# Patient Record
Sex: FEMALE | Race: BLACK OR AFRICAN AMERICAN | NOT HISPANIC OR LATINO
[De-identification: names, ages, dates, MRNs, and addresses within clinical notes are randomized per-mention and may not be internally consistent; named-entity substitution may affect disease eponyms.]

---

## 2018-11-23 ENCOUNTER — APPOINTMENT (OUTPATIENT)
Dept: ANTEPARTUM | Facility: CLINIC | Age: 23
End: 2018-11-23

## 2018-11-23 ENCOUNTER — OUTPATIENT (OUTPATIENT)
Dept: OUTPATIENT SERVICES | Facility: HOSPITAL | Age: 23
LOS: 1 days | Discharge: HOME | End: 2018-11-23

## 2019-01-18 ENCOUNTER — TRANSCRIPTION ENCOUNTER (OUTPATIENT)
Age: 24
End: 2019-01-18

## 2019-01-18 ENCOUNTER — APPOINTMENT (OUTPATIENT)
Dept: ANTEPARTUM | Facility: CLINIC | Age: 24
End: 2019-01-18

## 2019-01-18 ENCOUNTER — OUTPATIENT (OUTPATIENT)
Dept: OUTPATIENT SERVICES | Facility: HOSPITAL | Age: 24
LOS: 1 days | Discharge: HOME | End: 2019-01-18

## 2019-06-18 ENCOUNTER — INPATIENT (INPATIENT)
Facility: HOSPITAL | Age: 24
LOS: 2 days | Discharge: HOME | End: 2019-06-21
Attending: OBSTETRICS & GYNECOLOGY | Admitting: OBSTETRICS & GYNECOLOGY
Payer: COMMERCIAL

## 2019-06-18 VITALS — TEMPERATURE: 99 F

## 2019-06-18 DIAGNOSIS — Z98.890 OTHER SPECIFIED POSTPROCEDURAL STATES: Chronic | ICD-10-CM

## 2019-06-18 LAB
APPEARANCE UR: ABNORMAL
BACTERIA # UR AUTO: ABNORMAL /HPF
BASOPHILS # BLD AUTO: 0.02 K/UL — SIGNIFICANT CHANGE UP (ref 0–0.2)
BASOPHILS NFR BLD AUTO: 0.3 % — SIGNIFICANT CHANGE UP (ref 0–1)
BILIRUB UR-MCNC: NEGATIVE — SIGNIFICANT CHANGE UP
COLOR SPEC: YELLOW — SIGNIFICANT CHANGE UP
DIFF PNL FLD: NEGATIVE — SIGNIFICANT CHANGE UP
EOSINOPHIL # BLD AUTO: 0.03 K/UL — SIGNIFICANT CHANGE UP (ref 0–0.7)
EOSINOPHIL NFR BLD AUTO: 0.4 % — SIGNIFICANT CHANGE UP (ref 0–8)
EPI CELLS # UR: ABNORMAL /HPF
GLUCOSE UR QL: NEGATIVE MG/DL — SIGNIFICANT CHANGE UP
HCT VFR BLD CALC: 34.9 % — LOW (ref 37–47)
HGB BLD-MCNC: 11.3 G/DL — LOW (ref 12–16)
IMM GRANULOCYTES NFR BLD AUTO: 0.6 % — HIGH (ref 0.1–0.3)
KETONES UR-MCNC: NEGATIVE — SIGNIFICANT CHANGE UP
LEUKOCYTE ESTERASE UR-ACNC: ABNORMAL
LYMPHOCYTES # BLD AUTO: 1.47 K/UL — SIGNIFICANT CHANGE UP (ref 1.2–3.4)
LYMPHOCYTES # BLD AUTO: 20.7 % — SIGNIFICANT CHANGE UP (ref 20.5–51.1)
MCHC RBC-ENTMCNC: 27.5 PG — SIGNIFICANT CHANGE UP (ref 27–31)
MCHC RBC-ENTMCNC: 32.4 G/DL — SIGNIFICANT CHANGE UP (ref 32–37)
MCV RBC AUTO: 84.9 FL — SIGNIFICANT CHANGE UP (ref 81–99)
MONOCYTES # BLD AUTO: 0.82 K/UL — HIGH (ref 0.1–0.6)
MONOCYTES NFR BLD AUTO: 11.6 % — HIGH (ref 1.7–9.3)
NEUTROPHILS # BLD AUTO: 4.71 K/UL — SIGNIFICANT CHANGE UP (ref 1.4–6.5)
NEUTROPHILS NFR BLD AUTO: 66.4 % — SIGNIFICANT CHANGE UP (ref 42.2–75.2)
NITRITE UR-MCNC: NEGATIVE — SIGNIFICANT CHANGE UP
NRBC # BLD: 0 /100 WBCS — SIGNIFICANT CHANGE UP (ref 0–0)
PH UR: 6.5 — SIGNIFICANT CHANGE UP (ref 5–8)
PLATELET # BLD AUTO: 120 K/UL — LOW (ref 130–400)
PRENATAL SYPHILIS TEST: SIGNIFICANT CHANGE UP
PROT UR-MCNC: ABNORMAL MG/DL
RBC # BLD: 4.11 M/UL — LOW (ref 4.2–5.4)
RBC # FLD: 14.1 % — SIGNIFICANT CHANGE UP (ref 11.5–14.5)
SP GR SPEC: 1.02 — SIGNIFICANT CHANGE UP (ref 1.01–1.03)
UROBILINOGEN FLD QL: 1 MG/DL (ref 0.2–0.2)
WBC # BLD: 7.09 K/UL — SIGNIFICANT CHANGE UP (ref 4.8–10.8)
WBC # FLD AUTO: 7.09 K/UL — SIGNIFICANT CHANGE UP (ref 4.8–10.8)
WBC UR QL: ABNORMAL /HPF

## 2019-06-18 RX ORDER — DINOPROSTONE 10 MG/241MG
10 INSERT VAGINAL ONCE
Refills: 0 | Status: COMPLETED | OUTPATIENT
Start: 2019-06-18 | End: 2019-06-18

## 2019-06-18 RX ORDER — OXYTOCIN 10 UNIT/ML
41.67 VIAL (ML) INJECTION
Qty: 20 | Refills: 0 | Status: DISCONTINUED | OUTPATIENT
Start: 2019-06-18 | End: 2019-06-21

## 2019-06-18 RX ORDER — SODIUM CHLORIDE 9 MG/ML
1000 INJECTION, SOLUTION INTRAVENOUS
Refills: 0 | Status: DISCONTINUED | OUTPATIENT
Start: 2019-06-18 | End: 2019-06-19

## 2019-06-18 RX ADMIN — DINOPROSTONE 10 MILLIGRAM(S): 10 INSERT VAGINAL at 21:40

## 2019-06-18 NOTE — OB PROVIDER H&P - NSHPLABSRESULTS_GEN_ALL_CORE
Sonograms     Sonograms  12w- dated c/w size, CL 37mm, ovaries normal, N 1mm  20w- breech, 3vc, ant placenta, MVP 48mm, CL 40mm, anatomy wnl

## 2019-06-18 NOTE — OB PROVIDER H&P - HISTORY OF PRESENT ILLNESS
23yo  at 41w4d, dated by LMP c/w first trimester sonogram, presents to labor and delivery for induction of labor for post EDC. Patient denies contractions, leakage of fluids, vaginal bleeding. Good fetal movement. Denies any complications in this pregnancy. GBS negative.

## 2019-06-18 NOTE — OB PROVIDER H&P - NSHPPHYSICALEXAM_GEN_ALL_CORE
Physical exam:    Vital Signs Last 24 Hrs  T(F): 98.6 (18 Jun 2019 20:50), Max: 98.6 (18 Jun 2019 20:40)  HR: 98 (18 Jun 2019 20:50) (98 - 98)  BP: 120/74 (18 Jun 2019 20:50) (120/74 - 120/74)  RR: 20 (18 Jun 2019 20:50) (20 - 20)  Udip trace protein  Gen: NAD  Abdomen: Soft, nontender, gravid. EFW 3600g  VE:  EFM: 125/mod/+accels  toco: occasional contractions Physical exam:    Vital Signs Last 24 Hrs  T(F): 98.6 (18 Jun 2019 20:50), Max: 98.6 (18 Jun 2019 20:40)  HR: 98 (18 Jun 2019 20:50) (98 - 98)  BP: 120/74 (18 Jun 2019 20:50) (120/74 - 120/74)  RR: 20 (18 Jun 2019 20:50) (20 - 20)  Udip trace protein  Gen: NAD  Abdomen: Soft, nontender, gravid. EFW 3600g  VE:  EFM: 125/mod/+accels  toco: occasional contractions  Sono: vertex Physical exam:  Vital Signs Last 24 Hrs  T(F): 98.6 (18 Jun 2019 20:50), Max: 98.6 (18 Jun 2019 20:40)  HR: 98 (18 Jun 2019 20:50) (98 - 98)  BP: 120/74 (18 Jun 2019 20:50) (120/74 - 120/74)  RR: 20 (18 Jun 2019 20:50) (20 - 20)  Udip trace protein  Gen: NAD  Abdomen: Soft, nontender, gravid. EFW 3600g  VE: cl/l/p vertex by sono, intact  EFM: 125/mod/+accels  toco: occasional contractions  Sono: vertex

## 2019-06-18 NOTE — OB PROVIDER H&P - ASSESSMENT
25yo  at 41w4d, GBS negative, measles immune, for IOL for postdates    -Admit to labor and delivery  -IV hydration and clear liquid diet  -Cervidil for induction  -Cont efm and toco  -Pain management PRN      Dr. Colon and Dr. Petit aware

## 2019-06-19 LAB
AMPHET UR-MCNC: NEGATIVE — SIGNIFICANT CHANGE UP
BARBITURATES UR SCN-MCNC: NEGATIVE — SIGNIFICANT CHANGE UP
BENZODIAZ UR-MCNC: NEGATIVE — SIGNIFICANT CHANGE UP
BLD GP AB SCN SERPL QL: SIGNIFICANT CHANGE UP
BUPRENORPHINE SCREEN, URINE RESULT: NEGATIVE — SIGNIFICANT CHANGE UP
COCAINE METAB.OTHER UR-MCNC: NEGATIVE — SIGNIFICANT CHANGE UP
L&D DRUG SCREEN, URINE: SIGNIFICANT CHANGE UP
METHADONE UR-MCNC: NEGATIVE — SIGNIFICANT CHANGE UP
OPIATES UR-MCNC: NEGATIVE — SIGNIFICANT CHANGE UP
OXYCODONE UR-MCNC: NEGATIVE — SIGNIFICANT CHANGE UP
PCP UR-MCNC: NEGATIVE — SIGNIFICANT CHANGE UP
PROPOXYPHENE QUALITATIVE URINE RESULT: NEGATIVE — SIGNIFICANT CHANGE UP

## 2019-06-19 PROCEDURE — 59400 OBSTETRICAL CARE: CPT

## 2019-06-19 RX ORDER — KETOROLAC TROMETHAMINE 30 MG/ML
30 SYRINGE (ML) INJECTION ONCE
Refills: 0 | Status: DISCONTINUED | OUTPATIENT
Start: 2019-06-19 | End: 2019-06-19

## 2019-06-19 RX ORDER — IBUPROFEN 200 MG
600 TABLET ORAL EVERY 6 HOURS
Refills: 0 | Status: COMPLETED | OUTPATIENT
Start: 2019-06-19 | End: 2020-05-17

## 2019-06-19 RX ORDER — DIPHENHYDRAMINE HCL 50 MG
25 CAPSULE ORAL EVERY 6 HOURS
Refills: 0 | Status: DISCONTINUED | OUTPATIENT
Start: 2019-06-19 | End: 2019-06-21

## 2019-06-19 RX ORDER — AER TRAVELER 0.5 G/1
1 SOLUTION RECTAL; TOPICAL EVERY 4 HOURS
Refills: 0 | Status: DISCONTINUED | OUTPATIENT
Start: 2019-06-19 | End: 2019-06-21

## 2019-06-19 RX ORDER — HYDROCORTISONE 1 %
1 OINTMENT (GRAM) TOPICAL EVERY 6 HOURS
Refills: 0 | Status: DISCONTINUED | OUTPATIENT
Start: 2019-06-19 | End: 2019-06-21

## 2019-06-19 RX ORDER — FENTANYL/BUPIVACAINE/NS/PF 2MCG/ML-.1
250 PLASTIC BAG, INJECTION (ML) INJECTION
Refills: 0 | Status: DISCONTINUED | OUTPATIENT
Start: 2019-06-19 | End: 2019-06-21

## 2019-06-19 RX ORDER — OXYCODONE HYDROCHLORIDE 5 MG/1
5 TABLET ORAL
Refills: 0 | Status: DISCONTINUED | OUTPATIENT
Start: 2019-06-19 | End: 2019-06-21

## 2019-06-19 RX ORDER — OXYTOCIN 10 UNIT/ML
333.33 VIAL (ML) INJECTION
Qty: 20 | Refills: 0 | Status: DISCONTINUED | OUTPATIENT
Start: 2019-06-19 | End: 2019-06-21

## 2019-06-19 RX ORDER — GLYCERIN ADULT
1 SUPPOSITORY, RECTAL RECTAL AT BEDTIME
Refills: 0 | Status: DISCONTINUED | OUTPATIENT
Start: 2019-06-19 | End: 2019-06-21

## 2019-06-19 RX ORDER — DOCUSATE SODIUM 100 MG
100 CAPSULE ORAL
Refills: 0 | Status: DISCONTINUED | OUTPATIENT
Start: 2019-06-19 | End: 2019-06-21

## 2019-06-19 RX ORDER — DIBUCAINE 1 %
1 OINTMENT (GRAM) RECTAL EVERY 6 HOURS
Refills: 0 | Status: DISCONTINUED | OUTPATIENT
Start: 2019-06-19 | End: 2019-06-21

## 2019-06-19 RX ORDER — LANOLIN
1 OINTMENT (GRAM) TOPICAL EVERY 6 HOURS
Refills: 0 | Status: DISCONTINUED | OUTPATIENT
Start: 2019-06-19 | End: 2019-06-21

## 2019-06-19 RX ORDER — PRAMOXINE HYDROCHLORIDE 150 MG/15G
1 AEROSOL, FOAM RECTAL EVERY 4 HOURS
Refills: 0 | Status: DISCONTINUED | OUTPATIENT
Start: 2019-06-19 | End: 2019-06-21

## 2019-06-19 RX ORDER — DEXAMETHASONE 0.5 MG/5ML
4 ELIXIR ORAL EVERY 6 HOURS
Refills: 0 | Status: DISCONTINUED | OUTPATIENT
Start: 2019-06-19 | End: 2019-06-21

## 2019-06-19 RX ORDER — SIMETHICONE 80 MG/1
80 TABLET, CHEWABLE ORAL EVERY 4 HOURS
Refills: 0 | Status: DISCONTINUED | OUTPATIENT
Start: 2019-06-19 | End: 2019-06-21

## 2019-06-19 RX ORDER — ACETAMINOPHEN 500 MG
975 TABLET ORAL
Refills: 0 | Status: DISCONTINUED | OUTPATIENT
Start: 2019-06-19 | End: 2019-06-21

## 2019-06-19 RX ORDER — NALOXONE HYDROCHLORIDE 4 MG/.1ML
0.1 SPRAY NASAL
Refills: 0 | Status: DISCONTINUED | OUTPATIENT
Start: 2019-06-19 | End: 2019-06-21

## 2019-06-19 RX ORDER — OXYCODONE HYDROCHLORIDE 5 MG/1
5 TABLET ORAL ONCE
Refills: 0 | Status: DISCONTINUED | OUTPATIENT
Start: 2019-06-19 | End: 2019-06-21

## 2019-06-19 RX ORDER — MAGNESIUM HYDROXIDE 400 MG/1
30 TABLET, CHEWABLE ORAL
Refills: 0 | Status: DISCONTINUED | OUTPATIENT
Start: 2019-06-19 | End: 2019-06-21

## 2019-06-19 RX ORDER — OXYTOCIN 10 UNIT/ML
2 VIAL (ML) INJECTION
Qty: 30 | Refills: 0 | Status: DISCONTINUED | OUTPATIENT
Start: 2019-06-19 | End: 2019-06-19

## 2019-06-19 RX ORDER — ONDANSETRON 8 MG/1
4 TABLET, FILM COATED ORAL EVERY 6 HOURS
Refills: 0 | Status: DISCONTINUED | OUTPATIENT
Start: 2019-06-19 | End: 2019-06-21

## 2019-06-19 RX ORDER — BENZOCAINE 10 %
1 GEL (GRAM) MUCOUS MEMBRANE EVERY 6 HOURS
Refills: 0 | Status: DISCONTINUED | OUTPATIENT
Start: 2019-06-19 | End: 2019-06-21

## 2019-06-19 RX ADMIN — SODIUM CHLORIDE 125 MILLILITER(S): 9 INJECTION, SOLUTION INTRAVENOUS at 09:46

## 2019-06-19 RX ADMIN — Medication 2 MILLIUNIT(S)/MIN: at 09:46

## 2019-06-19 RX ADMIN — Medication 30 MILLIGRAM(S): at 20:59

## 2019-06-19 NOTE — PROGRESS NOTE ADULT - SUBJECTIVE AND OBJECTIVE BOX
PGY2 Note:    Pt evaluated at bedside.    Vital Signs Last 24 Hrs  T(F): 98.24 (2019 16:54), Max: 98.6 (2019 20:40)  HR: 95 (2019 19:04) (57 - 98)  BP: 119/69 (2019 19:04) (97/56 - 132/77)  RR: 21 (2019 16:54) (19 - 21)    EFM: 125/mod/accels, occasional variable decelerations  [place]: q2-3min  SVE: 9.5/100/+1    Labs:                          11.3   7.09  )-----------( 120      ( 2019 21:00 )             34.9     Urinalysis Basic - ( 2019 21:00 )    Color: Yellow / Appearance: Cloudy / S.020 / pH: x  Gluc: x / Ketone: Negative  / Bili: Negative / Urobili: 1.0 mg/dL   Blood: x / Protein: Trace mg/dL / Nitrite: Negative   Leuk Esterase: Small / RBC: x / WBC 6-10 /HPF   Sq Epi: x / Non Sq Epi: Few /HPF / Bacteria: Few /HPF    Type + Screen (19 @ 04:20)    ABO RH Interpretation: B POS: 2019 5:38  SBELLAMY  No historical record of blood group and Rh, requires a second sample for  retype prior to the release of any blood products.  For prenatal, a  second sample on next visit.    Prenatal Syphilis Test (19 @ 21:00)    Prenatal Syphilis Test: Nonreact: Test Performed at:  [address]  [address]    UDS negative    Meds:  Pitocin  Epidural in place

## 2019-06-19 NOTE — PROGRESS NOTE ADULT - SUBJECTIVE AND OBJECTIVE BOX
Patient seen at bedside, resting comfortably. No complaints at this time.    T(C): 36.8 (06-19-19 @ 07:07), Max: 37 (06-18-19 @ 20:40)  HR: 91 (06-19-19 @ 09:09) (57 - 98)  BP: 117/64 (06-19-19 @ 09:09) (97/56 - 132/77)  RR: 19 (06-19-19 @ 07:07) (19 - 20)    EFM: 110/mod/+accels  TOCO: none  SVE: 3/80/-3, cervidil removed @ 0830    Meds:  lactated ringers. 1000 milliLiter(s) IV Continuous <Continuous>  oxytocin Infusion 41.667 milliUNIT(s)/Min IV Continuous <Continuous>  oxytocin Infusion 2 milliUNIT(s)/Min IV Continuous <Continuous>    Labs:    A/P: +  24y GP @ weeks, GBS ,    Plan:  Continue EFM/TOCO  Continue IV hydration  Continue Pitocin  Epidural in Place  Pain management PRN    Dr. eldridge Patient seen at bedside, resting comfortably. No complaints at this time.    T(C): 36.8 (19 @ 07:07), Max: 37 (19 @ 20:40)  HR: 91 (19 @ 09:09) (57 - 98)  BP: 117/64 (19 @ 09:09) (97/56 - 132/77)  RR: 19 (19 @ 07:07) (19 - 20)    EFM: 110/mod/+accels  TOCO: none  SVE: 380/-3, cervidil removed @ 0830    Meds:  lactated ringers. 1000 milliLiter(s) IV Continuous <Continuous>  oxytocin Infusion 41.667 milliUNIT(s)/Min IV Continuous <Continuous>  oxytocin Infusion 2 milliUNIT(s)/Min IV Continuous <Continuous>    Labs:    A/P: +  23 yo  @41w5d, GBS neg, measles immune, IOL for post dates, s/p cervidil, doing well.    Plan:  Continue EFM/TOCO  Continue IV hydration  Pitocin to be started after eating  Pain management PRN    Dr. Petit to be made aware

## 2019-06-19 NOTE — OB PROVIDER DELIVERY SUMMARY - NSPROVIDERDELIVERYNOTE_OBGYN_ALL_OB_FT
pt delivered a viable female infant over intact perenium  placenta delivered intact and spont.   pt stable mild lochia   uterus firm   no laceration   baby to reg nursery  pt stable.

## 2019-06-19 NOTE — PROGRESS NOTE ADULT - ASSESSMENT
23 yo  @41w5d, GBS neg, measles immune, IOL for post dates, cervidil in place, doing well.    - cont EFM and toco  - pain management PRN  - clear liquid diet, IV hydration  - monitor vitals  - c/w cervidil  - f/up UDS, t&s    Dr. Colon and Dr. Petit aware.

## 2019-06-19 NOTE — PROGRESS NOTE ADULT - SUBJECTIVE AND OBJECTIVE BOX
PGY I Note    S: Pt seen at bedside for labor check. Doing well, pain controlled.     Vital Signs Last 24 Hrs  T(F): 98.6 (2019 20:50), Max: 98.6 (2019 20:40)  HR: 73 (2019 02:09) (64 - 98)  BP: 103/60 (2019 02:09) (103/60 - 126/71)  RR: 20 (2019 20:50) (20 - 20)    EFM: 120/mod mary kay/+accel  TOCO: irregular  SVE: C/L/P @2140 by Dr. Lawrence    Labs:                        11.3   7.09  )-----------( 120      ( 2019 21:00 )             34.9               Urinalysis Basic - ( 2019 21:00 )    Color: Yellow / Appearance: Cloudy / S.020 / pH: x  Gluc: x / Ketone: Negative  / Bili: Negative / Urobili: 1.0 mg/dL   Blood: x / Protein: Trace mg/dL / Nitrite: Negative   Leuk Esterase: Small / RBC: x / WBC 6-10 /HPF   Sq Epi: x / Non Sq Epi: Few /HPF / Bacteria: Few /HPF        Prenatal Syphilis Test: Nonreact (19 @ 21:00)      Meds:  @2140 cervidil

## 2019-06-19 NOTE — PROGRESS NOTE ADULT - ASSESSMENT
23 yo  at 41w5d, GBS negative, s/p cervidil, epidural in place, on pitocin, undergoing IOL for post date  -Pt repositioned for occasional variable decelerations  -Continuous EFM and toco  -IV fluids  -Pain management PRN  -c/w pitocin  -anticipate     Discussed with >Dr. Luna and Dr. Petit

## 2019-06-20 LAB
BASOPHILS # BLD AUTO: 0.01 K/UL — SIGNIFICANT CHANGE UP (ref 0–0.2)
BASOPHILS NFR BLD AUTO: 0.1 % — SIGNIFICANT CHANGE UP (ref 0–1)
EOSINOPHIL # BLD AUTO: 0.03 K/UL — SIGNIFICANT CHANGE UP (ref 0–0.7)
EOSINOPHIL NFR BLD AUTO: 0.3 % — SIGNIFICANT CHANGE UP (ref 0–8)
HCT VFR BLD CALC: 31.4 % — LOW (ref 37–47)
HGB BLD-MCNC: 10 G/DL — LOW (ref 12–16)
IMM GRANULOCYTES NFR BLD AUTO: 0.5 % — HIGH (ref 0.1–0.3)
LYMPHOCYTES # BLD AUTO: 1.34 K/UL — SIGNIFICANT CHANGE UP (ref 1.2–3.4)
LYMPHOCYTES # BLD AUTO: 13.6 % — LOW (ref 20.5–51.1)
MCHC RBC-ENTMCNC: 27.2 PG — SIGNIFICANT CHANGE UP (ref 27–31)
MCHC RBC-ENTMCNC: 31.8 G/DL — LOW (ref 32–37)
MCV RBC AUTO: 85.6 FL — SIGNIFICANT CHANGE UP (ref 81–99)
MONOCYTES # BLD AUTO: 0.88 K/UL — HIGH (ref 0.1–0.6)
MONOCYTES NFR BLD AUTO: 9 % — SIGNIFICANT CHANGE UP (ref 1.7–9.3)
NEUTROPHILS # BLD AUTO: 7.52 K/UL — HIGH (ref 1.4–6.5)
NEUTROPHILS NFR BLD AUTO: 76.5 % — HIGH (ref 42.2–75.2)
NRBC # BLD: 0 /100 WBCS — SIGNIFICANT CHANGE UP (ref 0–0)
PLATELET # BLD AUTO: 105 K/UL — LOW (ref 130–400)
RBC # BLD: 3.67 M/UL — LOW (ref 4.2–5.4)
RBC # FLD: 14.3 % — SIGNIFICANT CHANGE UP (ref 11.5–14.5)
WBC # BLD: 9.83 K/UL — SIGNIFICANT CHANGE UP (ref 4.8–10.8)
WBC # FLD AUTO: 9.83 K/UL — SIGNIFICANT CHANGE UP (ref 4.8–10.8)

## 2019-06-20 RX ORDER — IBUPROFEN 200 MG
600 TABLET ORAL EVERY 6 HOURS
Refills: 0 | Status: DISCONTINUED | OUTPATIENT
Start: 2019-06-20 | End: 2019-06-21

## 2019-06-20 RX ADMIN — Medication 975 MILLIGRAM(S): at 20:48

## 2019-06-20 RX ADMIN — Medication 600 MILLIGRAM(S): at 05:52

## 2019-06-20 RX ADMIN — Medication 1 TABLET(S): at 11:06

## 2019-06-20 NOTE — PROGRESS NOTE ADULT - SUBJECTIVE AND OBJECTIVE BOX
OB attending  PPD #1    Pt doing well, pain well controlled. No overnight events, no acute complaints.    Ambulating: Yes  Voiding: Yes  Flatus: Yes  Bowel movements: Yes   Breast or bottle feeding: Breastfeeding  Diet: Regular    PAST MEDICAL & SURGICAL HISTORY:  No pertinent past medical history  H/O dilation and curettage      Physical Exam  Vital Signs Last 24 Hrs  T(C): 36.7 (2019 07:42), Max: 36.9 (2019 22:52)  T(F): 98.1 (2019 07:42), Max: 98.5 (2019 22:52)  HR: 92 (2019 07:42) (59 - 105)  BP: 101/59 (2019 07:42) (100/70 - 142/76)  BP(mean): --  RR: 20 (2019 07:42) (18 - 21)  SpO2: 100% (2019 19:06) (99% - 100%)  Gen: AAOx3, NAD  Abd: Soft, nontender, nondistended, BS+  Fundus: Firm, below umbilicus  Lochia: normal  Ext: No calf tenderness, no swelling    Labs:                        11.3   7.09  )-----------( 120      ( 2019 21:00 )             34.9   rh+      A/P: s/p , PPD #1, doing well  - continue current management  -check cbc  -d/c in am

## 2019-06-21 ENCOUNTER — TRANSCRIPTION ENCOUNTER (OUTPATIENT)
Age: 24
End: 2019-06-21

## 2019-06-21 VITALS
DIASTOLIC BLOOD PRESSURE: 73 MMHG | RESPIRATION RATE: 20 BRPM | TEMPERATURE: 98 F | SYSTOLIC BLOOD PRESSURE: 126 MMHG | HEART RATE: 63 BPM

## 2019-06-21 RX ADMIN — Medication 1 TABLET(S): at 12:10

## 2019-06-21 RX ADMIN — Medication 975 MILLIGRAM(S): at 03:53

## 2019-06-21 NOTE — DISCHARGE NOTE OB - PATIENT PORTAL LINK FT
You can access the [facility], offered by [facility], by registering with the following website: [email or web address]

## 2019-06-21 NOTE — DISCHARGE NOTE OB - CARE PLAN
Principal Discharge DX:	Vaginal delivery  Goal:	healthy patient  Assessment and plan of treatment:	followup with your obgyn doctor in 6 weeks for postpartum care

## 2019-06-21 NOTE — DISCHARGE NOTE OB - CARE PROVIDER_API CALL
Baron Kaur)  OBSGYN  Physicians  [address] [place] [address]  Phone: [phone]  Fax: [phone]  Follow Up Time: Routine

## 2019-06-21 NOTE — DISCHARGE NOTE OB - HOSPITAL COURSE
Patient admitted for induction of labor, delivered vaginally, discharged home on postpartum day 2 in stable condition.

## 2019-06-21 NOTE — PROGRESS NOTE ADULT - SUBJECTIVE AND OBJECTIVE BOX
OB attending  PPD #1    Pt doing well, pain well controlled. No overnight events, no acute complaints.    Ambulating: Yes  Voiding: Yes  Flatus: Yes  Bowel movements: Yes   Breast or bottle feeding: Breastfeeding  Diet: Regular    PAST MEDICAL & SURGICAL HISTORY:  No pertinent past medical history  H/O dilation and curettage      Physical Exam  Vital Signs Last 24 Hrs  T(C): 36.3 (2019 23:16), Max: 36.7 (2019 07:42)  T(F): 97.4 (2019 23:16), Max: 98.1 (2019 07:42)  HR: 64 (2019 23:16) (64 - 92)  BP: 109/70 (2019 23:16) (101/59 - 116/68)  BP(mean): --  RR: 18 (2019 23:16) (18 - 20)  SpO2: --  Gen: AAOx3, NAD  Abd: Soft, nontender, nondistended, BS+  Fundus: Firm, below umbilicus  Lochia: normal  Ext: No calf tenderness, no swelling    Labs:                        10.0   9.83  )-----------( 105      ( 2019 12:16 )             31.4         A/P:25 yo , s/p , PPD #1, doing well  - continue current management

## 2019-06-26 DIAGNOSIS — Z34.93 ENCOUNTER FOR SUPERVISION OF NORMAL PREGNANCY, UNSPECIFIED, THIRD TRIMESTER: ICD-10-CM

## 2019-06-26 DIAGNOSIS — O48.0 POST-TERM PREGNANCY: ICD-10-CM

## 2019-06-26 DIAGNOSIS — Z3A.41 41 WEEKS GESTATION OF PREGNANCY: ICD-10-CM

## 2020-09-15 NOTE — OB RN PATIENT PROFILE - NSWEIGHTCALCTOOLDRUG_GEN_A_CORE
Rituxan Pregnancy And Lactation Text: This medication is Pregnancy Category C and it isn't know if it is safe during pregnancy. It is unknown if this medication is excreted in breast milk but similar antibodies are known to be excreted.  used

## 2023-09-01 NOTE — PROCEDURE NOTE - NSTESTDOSEDET_OBGYN_ALL_OB
Negative for evidence of intravascular injection or CSF
warm and dry/color normal/no rashes/no ulcers

## 2025-01-06 NOTE — PROCEDURE NOTE - I WAS PRESENT DURING THE KEY PORTIONS OF THE PROCEDURE AND IMMEDIATELY AVAILABLE DURING THE ENTIRE PROCEDURE
Problem: Adult Inpatient Plan of Care  Goal: Plan of Care Review  Outcome: Progressing  Goal: Patient-Specific Goal (Individualized)  Outcome: Progressing  Goal: Absence of Hospital-Acquired Illness or Injury  Outcome: Progressing  Goal: Optimal Comfort and Wellbeing  Outcome: Progressing  Goal: Readiness for Transition of Care  Outcome: Progressing      Statement Selected